# Patient Record
Sex: MALE | Race: ASIAN | Employment: OTHER | URBAN - METROPOLITAN AREA
[De-identification: names, ages, dates, MRNs, and addresses within clinical notes are randomized per-mention and may not be internally consistent; named-entity substitution may affect disease eponyms.]

---

## 2017-04-27 PROBLEM — T40.601A NARCOTIC OVERDOSE (HCC): Status: ACTIVE | Noted: 2017-04-27

## 2017-04-27 PROBLEM — R45.851 SUICIDAL IDEATION: Status: ACTIVE | Noted: 2017-04-27

## 2017-04-27 PROBLEM — T40.602A: Status: ACTIVE | Noted: 2017-04-27

## 2017-04-27 NOTE — ED NOTES
Late entry: Spoke with pt's son who reports finding a suicide note. Copy of note placed in pt chart. Son states pt's wife passed away few weeks ago and pt has been depressed and suicidal since then.  Son states pt had someone stay with him everyday until la

## 2017-04-27 NOTE — ED NOTES
Spoke with poison control Julio C, recommendation for ETOH, Mg levels, and to continue monitoring pt closely. Also monitor bedside glucose q1-2 hours. Case number [id number].

## 2017-04-27 NOTE — PROGRESS NOTES
04/27/17 1731   Clinical Encounter Type   Visited With Patient   Routine Visit Introduction   Continue Visiting Yes   Crisis Visit ED   Patient's Supportive Strategies/Resources son and grandchildern    Referral From Nurse   Referral To leonardo Bettencourt

## 2017-04-27 NOTE — ED PROVIDER NOTES
Patient Seen in: [facility] AND [facility] Emergency Department    History   Patient presents with:  Altered Mental Status (neurologic)    Stated Complaint: possible OD/SI    HPI    The patient is a 80-year-old male who presents from home after family found him Years:         Smokeless Status: Never Used                        Comment: QUIT IN THE 1940'S    Alcohol Use: No                Review of Systems    Positive for stated complaint: possible OD/SI  Other systems are as noted in HPI.   Constitutional and mickey intracranial abnormality.         ED Course     Labs Reviewed   DRUG ABUSE PANEL 10 SCREEN - Abnormal; Notable for the following:     Ur. Opiates Screen Detected (*)     All other components within normal limits   BASIC METABOLIC PANEL (8) - Abnormal; Notab Please view results for these tests on the individual orders. RAINBOW DRAW BLUE   RAINBOW DRAW GOLD   RAINBOW DRAW DARK GREEN   RAINBOW DRAW LAVENDER TALL (BNP)      EKG    Rate, intervals and axes as noted on EKG Report.   Rate: 70  Rhythm: Sinus Rhy

## 2017-04-27 NOTE — ED INITIAL ASSESSMENT (HPI)
Pt brought in via EMS from home for possible suicide attempt/OD. EMS states family members received phone calls last night with pt saying \"goodbyes\". EMS noted pt's BP meds missing but pt hyper tensive with 's for EMS.  Pt received a dose of IM juan manuel

## 2017-04-27 NOTE — ED NOTES
Spoke with Dr. Crystal Leblanc about seclusion. No orders received at his time. Pt's family at bedside.

## 2017-04-27 NOTE — ED NOTES
Follow up phone call received from poison control. Recommendation to add on salicylate level to labs, continue to monitor blood glucose q1-2 hours, and repeat BMP and EKG 4-6 hours after intial results.

## 2017-04-27 NOTE — ED NOTES
Late entry: Pt's family present at bedside. Son has POA and DNR paperwork. Copies placed on pt chart.

## 2017-04-27 NOTE — H&P
[facility] Hospitalist H&P       CC: Patient presents with:  Altered Mental Status (neurologic)    PCP: Renetta Lee MD    ASSESSMENT / PLAN:     Mr. Justine Archibald is a 81 yo M with PMH of HTN, HL, gout who presented with suicide attempt.     Suicide attempt/overdose 1st; granddaughter moved into patient's home; very sad, lonely, misses his wife and does not want to be a burden to his family. Reports taking 20 tablets of telmisartan and also some of his wife's liquid morphine. No previous suicide attempts.  States he wo that he will not harm himself.        PMH  Past Medical History   Diagnosis Date   • OTHER DISEASES      NONE   • HYPERTENSION    • GOUT       PSH      Past Surgical History    APPENDECTOMY      OTHER SURGICAL HISTORY      Comment TURP   2000    CATARACT 04/27/2017   BILT 1.9 04/27/2017   TP 7.0 04/27/2017   AST 26 04/27/2017   ALT 22 04/27/2017   MG 1.8 04/27/2017       No results for input(s): TROP in the last 72 hours.     Additional Diagnostics: ECG: NSR, T wave flattening      Radiology:

## 2017-04-28 PROBLEM — F32.2 SEVERE MAJOR DEPRESSION, SINGLE EPISODE, WITHOUT PSYCHOTIC FEATURES (HCC): Status: ACTIVE | Noted: 2017-04-28

## 2017-04-28 NOTE — PLAN OF CARE
Problem: Patient Centered Care  Goal: Patient preferences are identified and integrated in the patient’s plan of care  Interventions:  - What would you like us to know as we care for you?  That i appreciate everything you guys do and thank you for listening

## 2017-04-28 NOTE — PROGRESS NOTES
04/28/17 1051   Clinical Encounter Type   Visited With Patient   Routine Visit Follow-up   Continue Visiting Yes   Crisis Visit Critical care   Patient Spiritual Encounters   Spiritual Assessment Completed 1   Spiritual Needs empathic listening, support

## 2017-04-28 NOTE — ED NOTES
Spoke with Oxana from poison control, relayed bmp, asa level, and ekg to her, no further orders, dr ponce aware of K of 2.9, no orders received

## 2017-04-28 NOTE — PROGRESS NOTES
[facility] Hospitalist Progress Note     CC: Hospital Follow up    PCP: Delmi Zabala MD       Assessment/Plan:     Principal Problem:    Narcotic overdose, intentional self-harm, initial encounter [facility])  Active Problems:    Narcotic overdose    Suicidal ideati temperature 97.8 °F (36.6 °C), temperature source Temporal, resp. rate 10, height 165.1 cm (5' 5\"), SpO2 94 %.     Temp:  [97.8 °F (36.6 °C)] 97.8 °F (36.6 °C)  Pulse:  [66-82] 77  Resp:  [8-20] 10  BP: (121-180)/() 134/67 mmHg      Intake/Output:  N

## 2017-04-29 NOTE — PROGRESS NOTES
Patient was yelling on phone in room stating, \"You bigshot doctor and you can't even help me. You are worthless matt, Big Shot! \" \".  A few moments later I received a call from the son, THE [facility], stating that he is the [employer] [facility] and an internist at [employer]

## 2017-04-29 NOTE — CONSULTS
[facility]    Report of Consultation    Ginette Chong Hsi Patient Status:  Inpatient    1924 MRN [id number]   Location [facility] 5SW/SE Attending Raymond Chiang MD   Hosp Day # 1 PCP Khang Torres MD     Date of Admission it.  Otherwise he reported that no one supposed to know about the suicidal attempt but his granddaughter discovered him because he told her brother neris in different way.   Patient report that he did not leave any letter other than a letter in his will w Social History    Smoking Status: Former Smoker                   Packs/Day: 0.00  Years:         Smokeless Status: Never Used                        Comment: QUIT IN THE 1940'S    Alcohol Use: No                    Current Medications:    Current Fa very talkative presented reliable and appropriate.   Otherwise patient presented with circumstantial thought the process and want to elaborate a lot of the data from the past.  He otherwise admitted that he has been feeling lonely, depressed, worthless and

## 2017-04-29 NOTE — PLAN OF CARE
Problem: Patient Centered Care  Goal: Patient preferences are identified and integrated in the patient’s plan of care  Interventions:  - What would you like us to know as we care for you?  Sitter with patient   - Provide timely, complete, and accurate infor verbalize concerns and demonstrate effective coping strategies  INTERVENTIONS:  - Assist patient/family to identify coping skills, available support systems and cultural and spiritual values  - Provide emotional support, including active listening and ackn

## 2017-04-29 NOTE — PLAN OF CARE
Problem: Patient Centered Care  Goal: Patient preferences are identified and integrated in the patient’s plan of care  Interventions:  - What would you like us to know as we care for you?  Sitter with patient   - Provide timely, complete, and accurate infor

## 2017-04-30 NOTE — PLAN OF CARE
Problem: Patient Centered Care  Goal: Patient preferences are identified and integrated in the patient’s plan of care  Interventions:  - What would you like us to know as we care for you?  Sitter with patient   - Provide timely, complete, and accurate infor acknowledgement of concerns of patient and caregivers  - Reduce environmental stimuli, as able  - Instruct patient/family in relaxation techniques, as appropriate  - Assess for spiritual and psychosocial needs and initiate Spiritual Care or Behavioral Heal

## 2017-04-30 NOTE — CONSULTS
[facility] Cardiology  Report of Consultation    Gia Cook Patient Status:  Inpatient    1924 MRN [id number]   Location [facility] 5SW/SE Attending Lisa Rogers MD   Hosp Day # 3 PCP Kenji Worley MD     Date of Admission:  2017   D 30 mL Oral Daily PRN   Calcium Carbonate Antacid (TUMS) chewable tab 500 mg 500 mg Oral Daily PRN   losartan (COZAAR) tab 100 mg 100 mg Oral Daily   docusate sodium (COLACE) cap 100 mg 100 mg Oral BID   PEG 3350 (MIRALAX) powder packet 17 g 17 g Oral Daily PEDAL:pedal pulses intact. EXT:no peripheral edema.       LABORATORY DATA:     Labs reviewed:      Tele: Sinus rhythm    EKG: Sinus rhythm with minimal nonspecific T-wave changes    Laboratory Data:  Recent Labs   Lab  04/28/17   1157  04/29/17   0742  0

## 2017-04-30 NOTE — DISCHARGE PLANNING
4/30/17 CM Discharge planning   Pt will need inpatient psych, awaiting psych eval and medical clearance. CM will continue to follow.   Raulito Notice  X [id number]

## 2017-04-30 NOTE — PROGRESS NOTES
[facility] Hospitalist Progress Note     CC: Hospital Follow up    PCP: Vy Rasmussen MD       Assessment/Plan:     Principal Problem:    Narcotic overdose, intentional self-harm, initial encounter [facility])  Active Problems:    Narcotic overdose    Suicidal ideati law, recommended not visiting tomorrow as visit normally upsets patient and son, causes increased anxiety and elevated BP for both of them  - family refusing to take patient home after inpatient psych admission, will need to go to SNF afterwards    FN:  - CTAB, no crackles or wheezes  CV: RRR, no murmurs, 2+ peripheral pulses  ABD: Soft, non-tender, non-distended, +BS  MSK: strength 5/5 in all extremities  Neuro: Grossly normal, CN intact, sensory intact  Psych: Affect- normal  SKIN: warm, dry  EXT: no minerva

## 2017-04-30 NOTE — PROGRESS NOTES
[facility] Hospitalist Progress Note     CC: Hospital Follow up    PCP: Renetta Lee MD       Assessment/Plan:     Principal Problem:    Narcotic overdose, intentional self-harm, initial encounter [facility])  Active Problems:    Narcotic overdose    Suicidal ideati will need to go to SNF afterwards    FN:  - IVF: none  - Diet: general  CODE: DNR- son brought paperwork     DVT Prophy: SCD, HSQ  Lines: PIV    Dispo: transfer to medical floor, remote tele    Outpatient records or previous hospital records reviewed. MCV  94.2  94.7   MCH  31.7  32.1*   MCHC  33.6  33.9   RDW  13.9  13.8   WBC  11.1*  10.0   PLT  166  163         Recent Labs   Lab  04/27/17   1859  04/28/17   1157  04/29/17   0742   GLU  141*  127*  102*   BUN  11  13  17   CREATSERUM  0.96  0.94  1.

## 2017-05-01 NOTE — PLAN OF CARE
Problem: Patient Centered Care  Goal: Patient preferences are identified and integrated in the patient’s plan of care  Interventions:  - What would you like us to know as we care for you?  Sitter with patient   - Provide timely, complete, and accurate infor Precautions, including constant direct observation by a care companion, sitter or RN  - Initiate consults as appropriate with Behavioral Health, Spiritual Care  - Evaluate care setting prior to admitting patient removing all contraband  - Remove all person

## 2017-05-01 NOTE — DISCHARGE PLANNING
YODIT spoke with MD and the patient may be medically stable for d/c to inpatient psych today. YODIT@ placed a call to [facility] to review the referral.  YODIT will send the petition and certificate when available.     Nicole Chan [facility]  [id number]

## 2017-05-01 NOTE — PLAN OF CARE
Problem: Patient Centered Care  Goal: Patient preferences are identified and integrated in the patient’s plan of care  Interventions:  - What would you like us to know as we care for you?  Sitter with patient   - Provide timely, complete, and accurate infor listening and acknowledgement of concerns of patient and caregivers  - Reduce environmental stimuli, as able  - Instruct patient/family in relaxation techniques, as appropriate  - Assess for spiritual and psychosocial needs and initiate Spiritual Care or B

## 2017-05-01 NOTE — PROGRESS NOTES
Ian Mahoney is a 80year old Chinese what the male without previous known psychiatric history but history of hypertension and hyperlipidemia who presented to the hospital after survive suicidal overdose on medication.   The patient seen today for over 80 m reported that he felt he begin excluded and wet he did build in his life started to have no control over it when there is supposed to respect him and respect his dignity.     Patient today exhibited delusional perception about the family who are trying to s medication.     HISTORY:  Past Medical History   Diagnosis Date   • OTHER DISEASES      NONE   • HYPERTENSION    • GOUT           Past Surgical History    APPENDECTOMY      OTHER SURGICAL HISTORY      Comment TURP   2000    CATARACT        Family History 97 04/30/2017   CO2 25 04/30/2017   * 04/30/2017   CA 8.6 04/30/2017   ALB 4.1 04/27/2017   ALKPHO 69 04/27/2017   TP 7.0 04/27/2017   AST 26 04/27/2017   ALT 22 04/27/2017   TSH 1.880 09/25/2007   PSA 1.0 12/16/2008   MG 1.8 04/27/2017   ETOH 0 04/ education and support. 2. Psychotherapy focusing on insight orientation and restructuring the negative thought. 3.  Patient will benefit from antidepressant medication but he refuse. Intense family dynamic:   1.   Focus on helping the patient accepting

## 2017-05-01 NOTE — PROGRESS NOTES
Dinesh Acosta is a 80year old Chinese what the male without previous known psychiatric history but history of hypertension and hyperlipidemia who presented to the hospital after survive suicidal overdose on medication.   The patient seen today for over 61 m because he is very close to his son and they have their own hobbies together nevertheless when they have dispute they are able to fix it on their own.   Patient believe that son was excited to show his wife and his daughter whom where they are when he attac hydroxide (MILK OF MAGNESIA) 400 MG/5ML suspension 30 mL 30 mL Oral Daily PRN   Spironolactone-HCTZ (ALDACTAZIDE) 25-25 MG per tab 0.5 tablet 0.5 tablet Oral Daily   Calcium Carbonate Antacid (TUMS) chewable tab 500 mg 500 mg Oral Daily PRN   losartan (COZ elaborate a lot of the stories from the past.  He otherwise admitted that he has been feeling lonely, depressed, but also reporting today that he was angry at his family.     Patient admitted that he intentionally overdose on medication and attempt to kill Visit:    No prescriptions requested or ordered in this encounter      4/30/2017  Devante Benitez MD

## 2017-05-01 NOTE — PROGRESS NOTES
[facility] Cardiology  Report of Consultation    Gia Cook Patient Status:  Inpatient    1924 MRN [id number]   Location [facility] 5SW/SE Attending Lisa Rogers MD   Hosp Day # 4 PCP Kenji Worley MD     Date of Admission:  2017   D suspension 30 mL 30 mL Oral Daily PRN   Spironolactone-HCTZ (ALDACTAZIDE) 25-25 MG per tab 0.5 tablet 0.5 tablet Oral Daily   Calcium Carbonate Antacid (TUMS) chewable tab 500 mg 500 mg Oral Daily PRN   losartan (COZAAR) tab 100 mg 100 mg Oral Daily   docu CAROTIDS:carotid pulses normal.   ABD AORTA:not enlarged. FEM:femoral pulses intact. PEDAL:pedal pulses intact. EXT:no peripheral edema.       LABORATORY DATA:     Labs reviewed:      Tele: Sinus rhythm    EKG: Sinus rhythm with minimal nonspecific T

## 2017-05-01 NOTE — PROGRESS NOTES
[facility] Hospitalist Progress Note     CC: Hospital Follow up    PCP: Arminda Jurado MD       Assessment/Plan:     Principal Problem:    Narcotic overdose, intentional self-harm, initial encounter [facility])  Active Problems:    Narcotic overdose    Suicidal ideati consulted, appreciate recs; added Spironolactone- HCTZ 25- 12.5 mg daily    Constipation  - bowel regimen     HL  - not on home meds    Gout  - home allopurinol    Social  - a lot of family issues ongoing between patient and son, patient throwing things at kg)  04/28/17 1413 : 162 lb (73.483 kg)  06/15/16 1223 : 133 lb (60.328 kg)  07/01/15 1025 : 133 lb 6.4 oz (60.51 kg)      Exam    GEN: elderly male in NAD, awake, alert  HEENT: EOMI, PERRLA  Neck: Supple, no JVD  Pulm: CTAB, no crackles or wheezes  CV: RR

## 2017-05-02 PROBLEM — F32.A DEPRESSIVE DISORDER: Status: ACTIVE | Noted: 2017-05-02

## 2017-05-02 NOTE — DISCHARGE PLANNING
Charli Horowitz from [address] informed RN at [facility] today that patient may be able to go to [address] if bed is available. RN did all discharge paperwork and endorsed discharge to Lowell Tolbert night shift RN.   Instructed night shift that if bed is available duane

## 2017-05-02 NOTE — DISCHARGE PLANNING
YODIT called [facility] at 7pm 5/1 and they were still reviewing the referral.  YODIT provided a phone number for the floor RN is they were able to accept the patient last.    9am: YODIT left a message for [facility] re the referral.    9:15am: [facility] refaxed    10:

## 2017-05-02 NOTE — PLAN OF CARE
Problem: Patient Centered Care  Goal: Patient preferences are identified and integrated in the patient’s plan of care  Interventions:  - What would you like us to know as we care for you?  Sitter with patient   - Provide timely, complete, and accurate infor and psychosocial needs and initiate Spiritual Care or Behavioral Health consult as needed   Outcome: Progressing    Problem: SELF HARM  Goal: Patient will be protected from self-harm  INTERVENTIONS:  - Initiate Suicide Precautions, including constant direc

## 2017-05-02 NOTE — DISCHARGE SUMMARY
General Medicine Discharge Summary     Patient ID:  Saravanan Tyler LXG  89 year old  2/23/1924    Admit date: 4/27/2017    Discharge date and time: 5/2/17    Attending Physician: Colby Reynolds MD     Consults: IP CONSULT TO HOSPITALIST  IP CONSULT TO PSYCHIATRY  IP away. Patient has never received treatment for depression and son offered to take patient to psychiatrist to seek treatment and patient stated that he would rather die than see a psychiatrist.    Patient admits that he stole some of his wife's pain medicat of history of depression- but son states patient has been very depressed lately; wife recently passed away on March 1st; granddaughter moved into patient's home; very sad, lonely, misses his wife and does not want to be a burden to his family  - Psych cons additional instructions         CONTINUE taking these medications          allopurinol 100 MG Tabs   Commonly known as:  ZYLOPRIM   TAKE 2 TABLETS BY MOUTH DAILY       Telmisartan 80 MG Tabs   Commonly known as:  MICARDIS   Take 1 tablet (80 mg total) by m Where to Get Your Medications      These medications were sent to [facility] [id number] [facility], [place] [address], [address] [place] [address], [address] [place] [address]     Phone:  [phone]    - AmLODIPine Besylate 10 MG

## 2017-05-02 NOTE — PROGRESS NOTES
[facility] Cardiology  Progress Note    Monster Aldridge Hsi Patient Status:  Inpatient    1924 MRN [id number]   Location [facility] 5SW/SE Attending Paul Greenberg MD   Hosp Day # 5 PCP Scotty Loyola MD     Assessment and Plan:   1.  HTN  - losartan + coreg kristin.                Elysia Salazar MD

## 2017-05-03 NOTE — PROGRESS NOTES
Alexis Adams is a 80year old Chinese what the male without previous known psychiatric history but history of hypertension and hyperlipidemia who presented to the hospital after survive suicidal overdose on medication.   The patient seen today for over 36 m depressive behavior. Patient educated about the need to work on his emotion by utilizing some mood destabilization and take medication will not alternate his cognition but clearly it knowing there is a lot of negativity.   Patient repeatedly declined any 93* 05/02/2017   CO2 25 05/02/2017   * 05/02/2017   CA 8.6 05/02/2017   ALB 4.1 04/27/2017   ALKPHO 69 04/27/2017   TP 7.0 04/27/2017   AST 26 04/27/2017   ALT 22 04/27/2017   TSH 1.880 09/25/2007   PSA 1.0 12/16/2008   MG 1.8 04/27/2017   ETOH 0 04 without psychotic feature :  1. Focus on education and support. 2. Psychotherapy focusing on insight orientation and restructuring the negative thought. 3.  Patient will benefit from antidepressant medication but he refuse.     Intense family dynamic:   T

## 2017-05-09 ENCOUNTER — HOSPITAL ENCOUNTER (OUTPATIENT)
Dept: GENERAL RADIOLOGY | Facility: HOSPITAL | Age: 82
Discharge: HOME | End: 2017-05-09
Attending: Other
Payer: MEDICARE

## 2017-05-09 PROCEDURE — 71020 XR CHEST PA + LAT CHEST (CPT=71020): CPT | Performed by: OTHER

## 2025-05-07 NOTE — PLAN OF CARE
Ldl goal . Currently  93, on crestor    Orders:    TSH, 3rd generation     Problem: Patient Centered Care  Goal: Patient preferences are identified and integrated in the patient’s plan of care  Interventions:  - What would you like us to know as we care for you?  Sitter with patient   - Provide timely, complete, and accurate infor strategies  INTERVENTIONS:  - Assist patient/family to identify coping skills, available support systems and cultural and spiritual values  - Provide emotional support, including active listening and acknowledgement of concerns of patient and caregivers  -